# Patient Record
Sex: MALE | Race: WHITE | Employment: FULL TIME | URBAN - METROPOLITAN AREA
[De-identification: names, ages, dates, MRNs, and addresses within clinical notes are randomized per-mention and may not be internally consistent; named-entity substitution may affect disease eponyms.]

---

## 2024-06-03 ENCOUNTER — OFFICE VISIT (OUTPATIENT)
Dept: UROLOGY | Age: 49
End: 2024-06-03
Payer: COMMERCIAL

## 2024-06-03 DIAGNOSIS — E29.1 HYPOGONADISM IN MALE: Primary | ICD-10-CM

## 2024-06-03 DIAGNOSIS — N52.01 ERECTILE DYSFUNCTION DUE TO ARTERIAL INSUFFICIENCY: ICD-10-CM

## 2024-06-03 PROCEDURE — 99204 OFFICE O/P NEW MOD 45 MIN: CPT | Performed by: UROLOGY

## 2024-06-03 RX ORDER — DICYCLOMINE HCL 20 MG
20 TABLET ORAL PRN
COMMUNITY
Start: 2022-07-07

## 2024-06-03 RX ORDER — TADALAFIL 20 MG/1
20 TABLET ORAL DAILY PRN
Qty: 10 TABLET | Refills: 5 | Status: SHIPPED | OUTPATIENT
Start: 2024-06-03

## 2024-06-03 ASSESSMENT — ENCOUNTER SYMPTOMS: RESPIRATORY NEGATIVE: 1

## 2024-06-03 NOTE — PROGRESS NOTES
[facility]  [address] [place] [id number]  [phone]    Pablito Bocanegra  : 1975    Chief Complaint   Patient presents with    New Patient    Erectile Dysfunction          HPI     Pablito Bocanegra is a 48 y.o.  male with concern for ED and low testosterone who is referred to clinic for evaluation.     He reports having an uneventful vasectomy with Dr. Mckeon in 2023.  Post-vas semen analysis showed NO sperm 2024.      Main concerns today are low libido, fatigue and ED.  He is on cialis 5 mg daily but feels that erections are still \"spotty\" / intermittent and do not last long enough to complete intercourse / achieve ejaculation.  Denies known cause but states things have worsened since vasectomy.  Denies any testicle pain.  No bothersome LUTS.  He has not tried any treatment for ED and has not had testosterone levels checked.     PSA 1.3 (2023)     History reviewed. No pertinent past medical history.    Past Surgical History:   Procedure Laterality Date    VASECTOMY       Current Outpatient Medications   Medication Sig Dispense Refill    dicyclomine (BENTYL) 20 MG tablet Take 1 tablet by mouth as needed (4 x daily)      tadalafil (CIALIS) 20 MG tablet Take 1 tablet by mouth daily as needed for Erectile Dysfunction 10 tablet 5    tadalafil (CIALIS) 5 MG tablet Take 1 tablet by mouth daily      terbinafine (LAMISIL) 250 MG tablet Take 1 tablet by mouth daily       No current facility-administered medications for this visit.     No Known Allergies  Social History     Socioeconomic History    Marital status: Single     Spouse name: Not on file    Number of children: Not on file    Years of education: Not on file    Highest education level: Not on file   Occupational History    Not on file   Tobacco Use    Smoking status: Former    Smokeless tobacco: Current   Substance and Sexual Activity    Alcohol use: Yes     Alcohol/week: 3.0 standard drinks of alcohol

## 2024-06-06 ASSESSMENT — ENCOUNTER SYMPTOMS
EYE PAIN: 0
SKIN LESIONS: 0
HEARTBURN: 0
COUGH: 0
ABDOMINAL PAIN: 0
BLOOD IN STOOL: 0
DIARRHEA: 0
WHEEZING: 0
SHORTNESS OF BREATH: 0
NAUSEA: 0
BACK PAIN: 0
INDIGESTION: 0
VOMITING: 0
EYE DISCHARGE: 0
CONSTIPATION: 0

## 2024-06-12 ENCOUNTER — NURSE ONLY (OUTPATIENT)
Dept: UROLOGY | Age: 49
End: 2024-06-12

## 2024-06-12 DIAGNOSIS — E29.1 HYPOGONADISM IN MALE: ICD-10-CM

## 2024-06-13 LAB — TESTOST SERPL-MCNC: 357 NG/DL (ref 264–916)

## 2024-06-16 LAB — TESTOST FREE SERPL-MCNC: 3.4 PG/ML (ref 6.8–21.5)

## 2024-06-17 DIAGNOSIS — E29.1 HYPOGONADISM IN MALE: Primary | ICD-10-CM

## 2024-06-18 ENCOUNTER — TELEPHONE (OUTPATIENT)
Dept: UROLOGY | Age: 49
End: 2024-06-18

## 2024-06-18 NOTE — TELEPHONE ENCOUNTER
Tried to call pt with results. Not able to get through. If pt calls back advise pt of results and appt for labs on 06/21/24 at 9:30 am

## 2024-06-18 NOTE — TELEPHONE ENCOUNTER
----- Message from Gustavo Conner MD sent at 6/17/2024  8:55 PM EDT -----  Mr. Bocanegra, your testosterone level did return low at 3.4.  I would recommend that we set up another recheck prior to 10 AM to confirm and then you will qualify for testosterone replacement medication.      I will have my medical assistant contact you to set up the repeat confirmation lab.  I will reach out to you again with those results once I have them.  Summer, I ordered labs today.  Please set up for pre-10 AM appointment for T labs.     Please let me know if you have any questions/concerns.     Best Regards,  Dr. Conner

## 2024-10-17 ENCOUNTER — PATIENT MESSAGE (OUTPATIENT)
Dept: UROLOGY | Age: 49
End: 2024-10-17

## 2024-10-18 ENCOUNTER — TELEPHONE (OUTPATIENT)
Dept: UROLOGY | Age: 49
End: 2024-10-18

## 2024-10-22 RX ORDER — TADALAFIL 5 MG/1
5 TABLET ORAL DAILY
Qty: 30 TABLET | Refills: 3 | Status: SHIPPED | OUTPATIENT
Start: 2024-10-22

## 2025-05-02 ENCOUNTER — LAB (OUTPATIENT)
Dept: UROLOGY | Age: 50
End: 2025-05-02

## 2025-05-02 DIAGNOSIS — E29.1 HYPOGONADISM IN MALE: Primary | ICD-10-CM

## 2025-05-04 LAB — TESTOST SERPL-MCNC: 477 NG/DL (ref 264–916)

## 2025-05-05 LAB
TESTOST FREE SERPL-MCNC: 6 PG/ML (ref 6.8–21.5)
TESTOST SERPL-MCNC: 477 NG/DL (ref 264–916)

## 2025-05-06 ENCOUNTER — RESULTS FOLLOW-UP (OUTPATIENT)
Dept: UROLOGY | Age: 50
End: 2025-05-06

## 2025-05-06 DIAGNOSIS — E29.1 HYPOGONADISM IN MALE: Primary | ICD-10-CM

## 2025-05-06 DIAGNOSIS — N40.0 BENIGN PROSTATIC HYPERPLASIA WITHOUT LOWER URINARY TRACT SYMPTOMS: ICD-10-CM

## 2025-05-06 DIAGNOSIS — E29.1 HYPOGONADISM IN MALE: ICD-10-CM

## 2025-05-06 RX ORDER — TESTOSTERONE 1.62 MG/G
2 GEL TRANSDERMAL DAILY
Qty: 1 EACH | Refills: 5 | Status: SHIPPED | OUTPATIENT
Start: 2025-05-06 | End: 2025-05-06 | Stop reason: SDUPTHER

## 2025-05-06 RX ORDER — TESTOSTERONE 1.62 MG/G
2 GEL TRANSDERMAL DAILY
Qty: 1 EACH | Refills: 5 | Status: SHIPPED | OUTPATIENT
Start: 2025-05-06 | End: 2026-05-06

## 2025-05-06 NOTE — TELEPHONE ENCOUNTER
Patient needing testosterone to go to different pharmacy.  I've added this it is the [facility] pharmacy ([facility]),  can you sign and close. Thank you!

## 2025-05-06 NOTE — RESULT ENCOUNTER NOTE
Mr. Bocanegra, your testosterone level did return low.  I am sending androgel (testosterone replacement) to your pharmacy today.  Please start taking 2 pumps (1 pump under each arm) once daily.  I then would like to see you back in about 3 months for follow up to see how things are going with the medication and to recheck your labs.     Summer, please set up 3 month follow up with me with testosterone labs prior    Best Regards,  Dr. Conner

## 2025-05-07 RX ORDER — TESTOSTERONE 1.62 MG/G
2 GEL TRANSDERMAL DAILY
Qty: 1 EACH | Refills: 5 | Status: SHIPPED | OUTPATIENT
Start: 2025-05-07 | End: 2026-05-07

## 2025-05-07 RX ORDER — TADALAFIL 5 MG/1
5 TABLET ORAL DAILY
Qty: 30 TABLET | Refills: 3 | Status: SHIPPED | OUTPATIENT
Start: 2025-05-07

## 2025-08-28 RX ORDER — TADALAFIL 5 MG/1
5 TABLET ORAL DAILY
Qty: 30 TABLET | Refills: 3 | Status: SHIPPED | OUTPATIENT
Start: 2025-08-28

## 2025-08-29 ENCOUNTER — OFFICE VISIT (OUTPATIENT)
Dept: INTERNAL MEDICINE CLINIC | Facility: CLINIC | Age: 50
End: 2025-08-29

## 2025-08-29 VITALS
SYSTOLIC BLOOD PRESSURE: 113 MMHG | DIASTOLIC BLOOD PRESSURE: 64 MMHG | TEMPERATURE: 98.1 F | OXYGEN SATURATION: 98 % | HEART RATE: 71 BPM | BODY MASS INDEX: 24.39 KG/M2 | WEIGHT: 196.2 LBS | HEIGHT: 75 IN

## 2025-08-29 DIAGNOSIS — R79.89 LOW TESTOSTERONE IN MALE: ICD-10-CM

## 2025-08-29 DIAGNOSIS — R10.9 ABDOMINAL CRAMPING: ICD-10-CM

## 2025-08-29 DIAGNOSIS — E78.00 PURE HYPERCHOLESTEROLEMIA: ICD-10-CM

## 2025-08-29 DIAGNOSIS — Z72.0 NICOTINE USE: ICD-10-CM

## 2025-08-29 DIAGNOSIS — Z76.89 ENCOUNTER TO ESTABLISH CARE: Primary | ICD-10-CM

## 2025-08-29 RX ORDER — DICYCLOMINE HCL 20 MG
20 TABLET ORAL EVERY 6 HOURS
Qty: 30 TABLET | Refills: 1 | Status: SHIPPED | OUTPATIENT
Start: 2025-08-29

## 2025-08-29 SDOH — ECONOMIC STABILITY: FOOD INSECURITY: WITHIN THE PAST 12 MONTHS, YOU WORRIED THAT YOUR FOOD WOULD RUN OUT BEFORE YOU GOT MONEY TO BUY MORE.: NEVER TRUE

## 2025-08-29 SDOH — ECONOMIC STABILITY: FOOD INSECURITY: WITHIN THE PAST 12 MONTHS, THE FOOD YOU BOUGHT JUST DIDN'T LAST AND YOU DIDN'T HAVE MONEY TO GET MORE.: NEVER TRUE

## 2025-08-29 ASSESSMENT — PATIENT HEALTH QUESTIONNAIRE - PHQ9
2. FEELING DOWN, DEPRESSED OR HOPELESS: NOT AT ALL
SUM OF ALL RESPONSES TO PHQ QUESTIONS 1-9: 0
SUM OF ALL RESPONSES TO PHQ QUESTIONS 1-9: 0
1. LITTLE INTEREST OR PLEASURE IN DOING THINGS: NOT AT ALL
SUM OF ALL RESPONSES TO PHQ QUESTIONS 1-9: 0
SUM OF ALL RESPONSES TO PHQ QUESTIONS 1-9: 0

## 2025-08-29 ASSESSMENT — ENCOUNTER SYMPTOMS
COUGH: 0
ABDOMINAL PAIN: 1
SHORTNESS OF BREATH: 0